# Patient Record
Sex: FEMALE | Race: OTHER | Employment: OTHER | RURAL
[De-identification: names, ages, dates, MRNs, and addresses within clinical notes are randomized per-mention and may not be internally consistent; named-entity substitution may affect disease eponyms.]

---

## 2017-05-11 ENCOUNTER — TELEPHONE (OUTPATIENT)
Dept: FAMILY MEDICINE CLINIC | Age: 77
End: 2017-05-11

## 2017-05-11 NOTE — TELEPHONE ENCOUNTER
Forwarded from call center. ..   Son Rashad Cary would like to speak to the doctor regarding a old pt of hers, would like for the doctor to take her back on as a patient,  please call son at [phone]

## 2017-05-12 NOTE — TELEPHONE ENCOUNTER
I spoke with pt's son Zully Buenrostro. His mother was a former pt of mine. May need OV for evaluation of cough, hx bronchiectasis. Will call for appt next week.

## 2017-05-22 ENCOUNTER — OFFICE VISIT (OUTPATIENT)
Dept: FAMILY MEDICINE CLINIC | Age: 77
End: 2017-05-22

## 2017-05-22 ENCOUNTER — TELEPHONE (OUTPATIENT)
Dept: FAMILY MEDICINE CLINIC | Age: 77
End: 2017-05-22

## 2017-05-22 ENCOUNTER — HOSPITAL ENCOUNTER (OUTPATIENT)
Dept: GENERAL RADIOLOGY | Age: 77
Discharge: HOME | End: 2017-05-22
Attending: FAMILY MEDICINE
Payer: MEDICARE

## 2017-05-22 VITALS
HEART RATE: 80 BPM | RESPIRATION RATE: 20 BRPM | OXYGEN SATURATION: 95 % | BODY MASS INDEX: 29.13 KG/M2 | TEMPERATURE: 99.3 F | DIASTOLIC BLOOD PRESSURE: 68 MMHG | HEIGHT: 60 IN | WEIGHT: 148.4 LBS | SYSTOLIC BLOOD PRESSURE: 125 MMHG

## 2017-05-22 DIAGNOSIS — J47.1 BRONCHIECTASIS WITH ACUTE EXACERBATION (HCC): ICD-10-CM

## 2017-05-22 DIAGNOSIS — M06.9 RHEUMATOID ARTHRITIS, INVOLVING UNSPECIFIED SITE, UNSPECIFIED RHEUMATOID FACTOR PRESENCE: ICD-10-CM

## 2017-05-22 DIAGNOSIS — J47.1 BRONCHIECTASIS WITH ACUTE EXACERBATION (HCC): Primary | ICD-10-CM

## 2017-05-22 PROCEDURE — 71020 XR CHEST PA LAT: CPT

## 2017-05-22 RX ORDER — SIMVASTATIN 10 MG/1
TABLET, FILM COATED ORAL
COMMUNITY

## 2017-05-22 RX ORDER — ALBUTEROL SULFATE 90 UG/1
AEROSOL, METERED RESPIRATORY (INHALATION)
COMMUNITY

## 2017-05-22 RX ORDER — CODEINE PHOSPHATE AND GUAIFENESIN 10; 100 MG/5ML; MG/5ML
5 SOLUTION ORAL
COMMUNITY

## 2017-05-22 RX ORDER — FLUTICASONE PROPIONATE AND SALMETEROL 250; 50 UG/1; UG/1
1 POWDER RESPIRATORY (INHALATION) EVERY 12 HOURS
Qty: 60 INHALER | Refills: 0 | Status: SHIPPED | OUTPATIENT
Start: 2017-05-22

## 2017-05-22 RX ORDER — LEVOFLOXACIN 500 MG/1
500 TABLET, FILM COATED ORAL DAILY
Qty: 10 TAB | Refills: 0 | Status: SHIPPED | OUTPATIENT
Start: 2017-05-22

## 2017-05-22 RX ORDER — CARVEDILOL 6.25 MG/1
TABLET ORAL 2 TIMES DAILY WITH MEALS
COMMUNITY

## 2017-05-22 RX ORDER — FLUTICASONE PROPIONATE AND SALMETEROL 250; 50 UG/1; UG/1
1 POWDER RESPIRATORY (INHALATION) EVERY 12 HOURS
COMMUNITY
End: 2017-05-22 | Stop reason: SDUPTHER

## 2017-05-22 RX ORDER — GUAIFENESIN 600 MG/1
600 TABLET, EXTENDED RELEASE ORAL 2 TIMES DAILY
COMMUNITY

## 2017-05-22 RX ORDER — METHOTREXATE 2.5 MG/1
7.5 TABLET ORAL
COMMUNITY

## 2017-05-22 NOTE — MR AVS SNAPSHOT
Visit Information Date & Time Provider Department Dept. Phone Encounter #  
 7/07/0511  9:03 PM Michael Jackson Toan [phone] [id number] Upcoming Health Maintenance Date Due DTaP/Tdap/Td series (1 - Tdap) 8/28/1959 ZOSTER VACCINE AGE 60> 8/28/1998 GLAUCOMA SCREENING Q2Y 8/28/2003 OSTEOPOROSIS SCREENING (DEXA) 8/28/2003 Pneumococcal 65+ Low/Medium Risk (1 of 2 - PCV13) 8/28/2003 MEDICARE YEARLY EXAM 8/28/2003 INFLUENZA AGE 9 TO ADULT 8/1/2017 Allergies as of 5/22/2017  Review Complete On: 3/36/4364 By: Sofi Franco MD  
 No Known Allergies Current Immunizations  Never Reviewed No immunizations on file. Not reviewed this visit You Were Diagnosed With   
  
 Codes Comments Bronchiectasis with acute exacerbation ([facility]    -  Primary ICD-10-CM: J47.1 ICD-9-CM: 494.1 Rheumatoid arthritis, involving unspecified site, unspecified rheumatoid factor presence ([facility]     ICD-10-CM: M06.9 ICD-9-CM: 714.0 Vitals BP Pulse Temp Resp Height(growth percentile) Weight(growth percentile) 125/68 (BP 1 Location: Right arm, BP Patient Position: Sitting) 80 99.3 °F (37.4 °C) (Oral) 20 5' (1.524 m) 148 lb 6.4 oz (67.3 kg) SpO2 BMI OB Status Smoking Status 95% 28.98 kg/m2 Menopause Never Smoker Vitals History BMI and BSA Data Body Mass Index Body Surface Area  
 28.98 kg/m 2 1.69 m 2 Preferred Pharmacy Pharmacy Name Phone [id number], [address] [place] [address] [phone] Your Updated Medication List  
  
   
This list is accurate as of: 5/22/17  3:45 PM.  Always use your most recent med list.  
  
  
  
  
 carvedilol 6.25 mg tablet Commonly known as:  Isai Baxter Take  by mouth two (2) times daily (with meals). fluticasone-salmeterol 250-50 mcg/dose diskus inhaler Commonly known as:  ADVAIR DISKUS  
 Take 1 Puff by inhalation every twelve (12) hours. guaiFENesin-codeine 100-10 mg/5 mL solution Commonly known as:  ROBITUSSIN AC Take 5 mL by mouth three (3) times daily as needed for Cough. Take one to two tsp every 4 hours  
  
 levoFLOXacin 500 mg tablet Commonly known as:  Dang Ricardo Take 1 Tab by mouth daily. methotrexate 2.5 mg tablet Commonly known as:  Irene Estelita Take 7.5 mg by mouth Every Friday. MUCINEX 600 mg ER tablet Generic drug:  guaiFENesin ER Take 600 mg by mouth two (2) times a day. PROAIR HFA 90 mcg/actuation inhaler Generic drug:  albuterol Take  by inhalation. tiotropium 18 mcg inhalation capsule Commonly known as:  [employer] [address] Take 1 Cap by inhalation daily. ZOCOR 10 mg tablet Generic drug:  simvastatin Take  by mouth nightly. Prescriptions Sent to Pharmacy Refills  
 fluticasone-salmeterol (ADVAIR DISKUS) 250-50 mcg/dose diskus inhaler 0 Sig: Take 1 Puff by inhalation every twelve (12) hours. Class: Normal  
 Pharmacy: [id number] Ph #: [phone] Route: Inhalation  
 tiotropium (SPIRIVA WITH HANDIHALER) 18 mcg inhalation capsule 0 Sig: Take 1 Cap by inhalation daily. Class: Normal  
 Pharmacy: [facility]/pharmacy #[id number] Ph #: [phone] Route: Inhalation  
 levoFLOXacin (LEVAQUIN) 500 mg tablet 0 Sig: Take 1 Tab by mouth daily. Class: Normal  
 [id number] [address] Ph #: [phone] Route: Oral  
  
To-Do List   
 05/22/2017 Imaging:  XR CHEST PA LAT Introducing [facility]! Erum Landverelizabeth introduces BetBox patient portal. Now you can access parts of your medical record, email your doctor's office, and request medication refills online. 1. In your internet browser, go to [email or web address]„¢t 2. Click on the First Time User? Click Here link in the Sign In box. You will see the New Member Sign Up page. 3. Enter your Suso Access Code exactly as it appears below. You will not need to use this code after youve completed the sign-up process. If you do not sign up before the expiration date, you must request a new code. · Suso Access Code: [email or web address] Expires: 8/20/2017  2:38 PM 
 
4. Enter the last four digits of your Social Security Number (xxxx) and Date of Birth (mm/dd/yyyy) as indicated and click Submit. You will be taken to the next sign-up page. 5. Create a SmartPay Solutionst ID. This will be your Suso login ID and cannot be changed, so think of one that is secure and easy to remember. 6. Create a Suso password. You can change your password at any time. 7. Enter your Password Reset Question and Answer. This can be used at a later time if you forget your password. 8. Enter your e-mail address. You will receive e-mail notification when new information is available in [address] 9. Click Sign Up. You can now view and download portions of your medical record. 10. Click the Download Summary menu link to download a portable copy of your medical information. If you have questions, please visit the Frequently Asked Questions section of the Suso website. Remember, Suso is NOT to be used for urgent needs. For medical emergencies, dial 911. Now available from your iPhone and Android! Please provide this summary of care documentation to your next provider. Your primary care clinician is listed as Ardean Holstein. If you have any questions after today's visit, please call [phone].

## 2017-05-22 NOTE — TELEPHONE ENCOUNTER
I spoke with Lesvia Henderson about CXR results showing pneumonia. Take Levaquin. advised against going on trip to [facility]. He plans to take her back to his sibling in [place] on [address]. Advised to get follow CXR in 3-4 weeks. He understands.

## 2017-05-22 NOTE — PROGRESS NOTES
Identified pt with two pt identifiers(name and ). Chief Complaint   Patient presents with    Pneumonia     she has had for a month - started before she got here from [facility] - [facility] - still coughing         Health Maintenance Due   Topic    DTaP/Tdap/Td series (1 - Tdap)    ZOSTER VACCINE AGE 60>     GLAUCOMA SCREENING Q2Y     OSTEOPOROSIS SCREENING (DEXA)     Pneumococcal 65+ Low/Medium Risk (1 of 2 - PCV13)    MEDICARE YEARLY EXAM        Wt Readings from Last 3 Encounters:   17 148 lb 6.4 oz (67.3 kg)     Temp Readings from Last 3 Encounters:   17 99.3 °F (37.4 °C) (Oral)     BP Readings from Last 3 Encounters:   17 125/68     Pulse Readings from Last 3 Encounters:   17 80         Learning Assessment:  :     Learning Assessment 2017   PRIMARY LEARNER Patient   HIGHEST LEVEL OF EDUCATION - PRIMARY LEARNER  GRADUATED HIGH SCHOOL OR GED   BARRIERS PRIMARY LEARNER NONE   CO-LEARNER CAREGIVER No   PRIMARY LANGUAGE ENGLISH   LEARNER PREFERENCE PRIMARY OTHER (COMMENT)   ANSWERED BY self   RELATIONSHIP SELF       Depression Screening:  :     PHQ over the last two weeks 2017   Little interest or pleasure in doing things Not at all   Feeling down, depressed or hopeless Not at all   Total Score PHQ 2 0       Fall Risk Assessment:  :     Fall Risk Assessment, last 12 mths 2017   Able to walk? Yes   Fall in past 12 months? No       Abuse Screening:  :     No flowsheet data found. Coordination of Care Questionnaire:  :     1) Have you been to an emergency room, urgent care clinic since your last visit? yes [facility] 17 bronchitis   Hospitalized since your last visit? no             2) Have you seen or consulted any other health care providers outside of [address] [place] [place] [address] since your last visit? no  (Include any pap smears or colon screenings in this section.)    3) Do you have an Advance Directive on file? no  Are you interested in receiving information about Advance Directives? no    Patient is accompanied by son I have received verbal consent from Richi Reed to discuss any/all medical information while they are present in the room. Reviewed record in preparation for visit and have obtained necessary documentation. Medication reconciliation up to date and corrected with patient at this time.

## 2017-05-23 PROBLEM — I10 ESSENTIAL HYPERTENSION: Status: ACTIVE | Noted: 2017-05-23

## 2017-05-23 PROBLEM — M05.79 RHEUMATOID ARTHRITIS INVOLVING MULTIPLE SITES WITH POSITIVE RHEUMATOID FACTOR (HCC): Status: ACTIVE | Noted: 2017-05-23

## 2017-05-23 PROBLEM — J47.1 BRONCHIECTASIS WITH ACUTE EXACERBATION (HCC): Status: ACTIVE | Noted: 2017-05-23

## 2017-05-23 PROBLEM — E78.00 HYPERCHOLESTEROLEMIA: Status: ACTIVE | Noted: 2017-05-23

## 2017-05-23 NOTE — PROGRESS NOTES
HISTORY OF PRESENT ILLNESS  Kim Villalobos is a 64 y.o. female. HPI  Pt C/O persistent productive cough x over a week. Hx of bronchiectasis although she is NOT using her prescribed inhalers, including Advair. Fever present. Denies CP or SOB. She went to [facility] and had CXR. Prescribed Z Rene. She has finished this. Cough persistent. Supposed to travel with son on a cruise in [facility]. Past Medical History:   Diagnosis Date    Arthritis     Chronic obstructive pulmonary disease ([facility]     Hypercholesterolemia    History reviewed. No pertinent surgical history. Current Outpatient Prescriptions   Medication Sig Dispense Refill    carvedilol (COREG) 6.25 mg tablet Take  by mouth two (2) times daily (with meals).  simvastatin (ZOCOR) 10 mg tablet Take  by mouth nightly.  albuterol (PROAIR HFA) 90 mcg/actuation inhaler Take  by inhalation.  methotrexate (RHEUMATREX) 2.5 mg tablet Take 7.5 mg by mouth Every Friday.  guaiFENesin ER (MUCINEX) 600 mg ER tablet Take 600 mg by mouth two (2) times a day.  guaiFENesin-codeine (ROBITUSSIN AC) 100-10 mg/5 mL solution Take 5 mL by mouth three (3) times daily as needed for Cough. Take one to two tsp every 4 hours      fluticasone-salmeterol (ADVAIR DISKUS) 250-50 mcg/dose diskus inhaler Take 1 Puff by inhalation every twelve (12) hours. 60 Inhaler 0    tiotropium (SPIRIVA WITH HANDIHALER) 18 mcg inhalation capsule Take 1 Cap by inhalation daily. 30 Cap 0    levoFLOXacin (LEVAQUIN) 500 mg tablet Take 1 Tab by mouth daily. 10 Tab 0     Social History     Social History    Marital status:      Spouse name: N/A    Number of children: N/A    Years of education: N/A     Occupational History    Not on file. Social History Main Topics    Smoking status: Never Smoker    Smokeless tobacco: Never Used    Alcohol use Yes    Drug use:  Yes    Sexual activity: No     Other Topics Concern    Not on file     Social History Narrative    No narrative on file     Family History   Problem Relation Age of Onset    Alzheimer Mother        Review of Systems   Constitutional: Positive for fever and malaise/fatigue. Respiratory: Positive for cough, sputum production, shortness of breath and wheezing. Negative for hemoptysis. Cardiovascular: Negative. All other systems reviewed and are negative. Physical Exam   Constitutional: She is oriented to person, place, and time. She appears well-developed and well-nourished. HENT:   Right Ear: External ear normal.   Left Ear: External ear normal.   Mouth/Throat: Oropharynx is clear and moist.   Eyes: Conjunctivae and EOM are normal.   Neck: Neck supple. Cardiovascular: Normal rate, regular rhythm and normal heart sounds. Pulmonary/Chest: Effort normal. She has rhonchi in the right upper field, the right middle field and the right lower field. She has rales in the right lower field. She exhibits no tenderness. Abdominal: Soft. Bowel sounds are normal.   Musculoskeletal: Normal range of motion. Neurological: She is alert and oriented to person, place, and time. Vitals reviewed. ASSESSMENT and PLAN    ICD-10-CM ICD-9-CM    1. Bronchiectasis with acute exacerbation (HCC) J47.1 494.1 fluticasone-salmeterol (ADVAIR DISKUS) 250-50 mcg/dose diskus inhaler      tiotropium (SPIRIVA WITH HANDIHALER) 18 mcg inhalation capsule      XR CHEST PA LAT      levoFLOXacin (LEVAQUIN) 500 mg tablet      AMB SUPPLY ORDER   2. Rheumatoid arthritis, involving unspecified site, unspecified rheumatoid factor presence ([facility] M06.9 714.0      Order CXR. If she has pneumonia, advised to not travel to [facility]. Switch antibiotic to Levaquin 500 mg x 10 days. Also recommend she use all 3 inhalers. Pt verbalizes understanding of plan of care and denies further questions or concerns at this time.